# Patient Record
Sex: FEMALE | Race: BLACK OR AFRICAN AMERICAN | NOT HISPANIC OR LATINO | URBAN - METROPOLITAN AREA
[De-identification: names, ages, dates, MRNs, and addresses within clinical notes are randomized per-mention and may not be internally consistent; named-entity substitution may affect disease eponyms.]

---

## 2018-07-10 ENCOUNTER — OUTPATIENT (OUTPATIENT)
Dept: OUTPATIENT SERVICES | Facility: HOSPITAL | Age: 63
LOS: 1 days | End: 2018-07-10
Payer: COMMERCIAL

## 2018-07-10 VITALS
DIASTOLIC BLOOD PRESSURE: 67 MMHG | TEMPERATURE: 98 F | RESPIRATION RATE: 16 BRPM | WEIGHT: 143.96 LBS | SYSTOLIC BLOOD PRESSURE: 104 MMHG | HEART RATE: 65 BPM | OXYGEN SATURATION: 100 % | HEIGHT: 63 IN

## 2018-07-10 DIAGNOSIS — Z96.642 PRESENCE OF LEFT ARTIFICIAL HIP JOINT: Chronic | ICD-10-CM

## 2018-07-10 DIAGNOSIS — N95.0 POSTMENOPAUSAL BLEEDING: ICD-10-CM

## 2018-07-10 DIAGNOSIS — Z01.818 ENCOUNTER FOR OTHER PREPROCEDURAL EXAMINATION: ICD-10-CM

## 2018-07-10 DIAGNOSIS — N85.00 ENDOMETRIAL HYPERPLASIA, UNSPECIFIED: ICD-10-CM

## 2018-07-10 DIAGNOSIS — Z98.890 OTHER SPECIFIED POSTPROCEDURAL STATES: Chronic | ICD-10-CM

## 2018-07-10 PROCEDURE — G0463: CPT

## 2018-07-10 NOTE — H&P PST ADULT - NEGATIVE OPHTHALMOLOGIC SYMPTOMS
no lacrimation L/no lacrimation R/no blurred vision L/no photophobia/no blurred vision R/no diplopia

## 2018-07-10 NOTE — H&P PST ADULT - NSANTHOSAYNRD_GEN_A_CORE
No. DASH screening performed.  STOP BANG Legend: 0-2 = LOW Risk; 3-4 = INTERMEDIATE Risk; 5-8 = HIGH Risk

## 2018-07-10 NOTE — H&P PST ADULT - NEGATIVE ALLERGY TYPES
no reactions to insect bites/no indoor environmental allergies/no reactions to food/no reactions to animals/no reactions to medicines

## 2020-01-08 ENCOUNTER — EMERGENCY (EMERGENCY)
Facility: HOSPITAL | Age: 65
LOS: 1 days | Discharge: HOME | End: 2020-01-08
Attending: EMERGENCY MEDICINE
Payer: COMMERCIAL

## 2020-01-08 VITALS
HEIGHT: 65 IN | TEMPERATURE: 98 F | DIASTOLIC BLOOD PRESSURE: 78 MMHG | HEART RATE: 87 BPM | WEIGHT: 136.03 LBS | SYSTOLIC BLOOD PRESSURE: 112 MMHG | OXYGEN SATURATION: 97 % | RESPIRATION RATE: 17 BRPM

## 2020-01-08 VITALS
HEART RATE: 74 BPM | OXYGEN SATURATION: 100 % | RESPIRATION RATE: 16 BRPM | DIASTOLIC BLOOD PRESSURE: 69 MMHG | TEMPERATURE: 98 F | SYSTOLIC BLOOD PRESSURE: 102 MMHG

## 2020-01-08 DIAGNOSIS — Z96.642 PRESENCE OF LEFT ARTIFICIAL HIP JOINT: Chronic | ICD-10-CM

## 2020-01-08 DIAGNOSIS — Z98.890 OTHER SPECIFIED POSTPROCEDURAL STATES: Chronic | ICD-10-CM

## 2020-01-08 PROBLEM — N95.0 POSTMENOPAUSAL BLEEDING: Chronic | Status: ACTIVE | Noted: 2018-07-10

## 2020-01-08 PROBLEM — R76.11 NONSPECIFIC REACTION TO TUBERCULIN SKIN TEST WITHOUT ACTIVE TUBERCULOSIS: Chronic | Status: ACTIVE | Noted: 2018-07-10

## 2020-01-08 PROBLEM — F32.9 MAJOR DEPRESSIVE DISORDER, SINGLE EPISODE, UNSPECIFIED: Chronic | Status: ACTIVE | Noted: 2018-07-10

## 2020-01-08 PROBLEM — N85.00 ENDOMETRIAL HYPERPLASIA, UNSPECIFIED: Chronic | Status: ACTIVE | Noted: 2018-07-10

## 2020-01-08 LAB
ALBUMIN SERPL ELPH-MCNC: 4.1 G/DL — SIGNIFICANT CHANGE UP (ref 3.3–5)
ALP SERPL-CCNC: 109 U/L — SIGNIFICANT CHANGE UP (ref 40–120)
ALT FLD-CCNC: 34 U/L — SIGNIFICANT CHANGE UP (ref 10–45)
ANION GAP SERPL CALC-SCNC: 13 MMOL/L — SIGNIFICANT CHANGE UP (ref 5–17)
APTT BLD: 36 SEC — SIGNIFICANT CHANGE UP (ref 27.5–36.3)
AST SERPL-CCNC: 18 U/L — SIGNIFICANT CHANGE UP (ref 10–40)
BASOPHILS # BLD AUTO: 0.03 K/UL — SIGNIFICANT CHANGE UP (ref 0–0.2)
BASOPHILS NFR BLD AUTO: 0.4 % — SIGNIFICANT CHANGE UP (ref 0–2)
BILIRUB SERPL-MCNC: 0.2 MG/DL — SIGNIFICANT CHANGE UP (ref 0.2–1.2)
BUN SERPL-MCNC: 10 MG/DL — SIGNIFICANT CHANGE UP (ref 7–23)
CALCIUM SERPL-MCNC: 9.5 MG/DL — SIGNIFICANT CHANGE UP (ref 8.4–10.5)
CHLORIDE SERPL-SCNC: 100 MMOL/L — SIGNIFICANT CHANGE UP (ref 96–108)
CO2 SERPL-SCNC: 25 MMOL/L — SIGNIFICANT CHANGE UP (ref 22–31)
CREAT SERPL-MCNC: 1.06 MG/DL — SIGNIFICANT CHANGE UP (ref 0.5–1.3)
EOSINOPHIL # BLD AUTO: 0.42 K/UL — SIGNIFICANT CHANGE UP (ref 0–0.5)
EOSINOPHIL NFR BLD AUTO: 5.6 % — SIGNIFICANT CHANGE UP (ref 0–6)
GLUCOSE SERPL-MCNC: 113 MG/DL — HIGH (ref 70–99)
HCT VFR BLD CALC: 39.8 % — SIGNIFICANT CHANGE UP (ref 34.5–45)
HGB BLD-MCNC: 13 G/DL — SIGNIFICANT CHANGE UP (ref 11.5–15.5)
IMM GRANULOCYTES NFR BLD AUTO: 0.1 % — SIGNIFICANT CHANGE UP (ref 0–1.5)
INR BLD: 0.93 RATIO — SIGNIFICANT CHANGE UP (ref 0.88–1.16)
LIDOCAIN IGE QN: 151 U/L — HIGH (ref 7–60)
LYMPHOCYTES # BLD AUTO: 3.01 K/UL — SIGNIFICANT CHANGE UP (ref 1–3.3)
LYMPHOCYTES # BLD AUTO: 40.4 % — SIGNIFICANT CHANGE UP (ref 13–44)
MAGNESIUM SERPL-MCNC: 2 MG/DL — SIGNIFICANT CHANGE UP (ref 1.6–2.6)
MCHC RBC-ENTMCNC: 31.3 PG — SIGNIFICANT CHANGE UP (ref 27–34)
MCHC RBC-ENTMCNC: 32.7 GM/DL — SIGNIFICANT CHANGE UP (ref 32–36)
MCV RBC AUTO: 95.7 FL — SIGNIFICANT CHANGE UP (ref 80–100)
MONOCYTES # BLD AUTO: 0.54 K/UL — SIGNIFICANT CHANGE UP (ref 0–0.9)
MONOCYTES NFR BLD AUTO: 7.2 % — SIGNIFICANT CHANGE UP (ref 2–14)
NEUTROPHILS # BLD AUTO: 3.44 K/UL — SIGNIFICANT CHANGE UP (ref 1.8–7.4)
NEUTROPHILS NFR BLD AUTO: 46.3 % — SIGNIFICANT CHANGE UP (ref 43–77)
NRBC # BLD: 0 /100 WBCS — SIGNIFICANT CHANGE UP (ref 0–0)
PHOSPHATE SERPL-MCNC: 3.3 MG/DL — SIGNIFICANT CHANGE UP (ref 2.5–4.5)
PLATELET # BLD AUTO: 379 K/UL — SIGNIFICANT CHANGE UP (ref 150–400)
POTASSIUM SERPL-MCNC: 4 MMOL/L — SIGNIFICANT CHANGE UP (ref 3.5–5.3)
POTASSIUM SERPL-SCNC: 4 MMOL/L — SIGNIFICANT CHANGE UP (ref 3.5–5.3)
PROT SERPL-MCNC: 7.8 G/DL — SIGNIFICANT CHANGE UP (ref 6–8.3)
PROTHROM AB SERPL-ACNC: 10.7 SEC — SIGNIFICANT CHANGE UP (ref 10–12.9)
RBC # BLD: 4.16 M/UL — SIGNIFICANT CHANGE UP (ref 3.8–5.2)
RBC # FLD: 13.1 % — SIGNIFICANT CHANGE UP (ref 10.3–14.5)
SODIUM SERPL-SCNC: 138 MMOL/L — SIGNIFICANT CHANGE UP (ref 135–145)
WBC # BLD: 7.45 K/UL — SIGNIFICANT CHANGE UP (ref 3.8–10.5)
WBC # FLD AUTO: 7.45 K/UL — SIGNIFICANT CHANGE UP (ref 3.8–10.5)

## 2020-01-08 PROCEDURE — 85610 PROTHROMBIN TIME: CPT

## 2020-01-08 PROCEDURE — 99284 EMERGENCY DEPT VISIT MOD MDM: CPT

## 2020-01-08 PROCEDURE — 96374 THER/PROPH/DIAG INJ IV PUSH: CPT

## 2020-01-08 PROCEDURE — 85027 COMPLETE CBC AUTOMATED: CPT

## 2020-01-08 PROCEDURE — 80053 COMPREHEN METABOLIC PANEL: CPT

## 2020-01-08 PROCEDURE — 84100 ASSAY OF PHOSPHORUS: CPT

## 2020-01-08 PROCEDURE — 83690 ASSAY OF LIPASE: CPT

## 2020-01-08 PROCEDURE — 85730 THROMBOPLASTIN TIME PARTIAL: CPT

## 2020-01-08 PROCEDURE — 99284 EMERGENCY DEPT VISIT MOD MDM: CPT | Mod: 25

## 2020-01-08 PROCEDURE — 83735 ASSAY OF MAGNESIUM: CPT

## 2020-01-08 RX ORDER — SODIUM CHLORIDE 9 MG/ML
1000 INJECTION INTRAMUSCULAR; INTRAVENOUS; SUBCUTANEOUS ONCE
Refills: 0 | Status: COMPLETED | OUTPATIENT
Start: 2020-01-08 | End: 2020-01-08

## 2020-01-08 RX ORDER — ONDANSETRON 8 MG/1
4 TABLET, FILM COATED ORAL ONCE
Refills: 0 | Status: COMPLETED | OUTPATIENT
Start: 2020-01-08 | End: 2020-01-08

## 2020-01-08 RX ADMIN — Medication 20 MILLIGRAM(S): at 18:04

## 2020-01-08 RX ADMIN — SODIUM CHLORIDE 1000 MILLILITER(S): 9 INJECTION INTRAMUSCULAR; INTRAVENOUS; SUBCUTANEOUS at 17:36

## 2020-01-08 RX ADMIN — ONDANSETRON 4 MILLIGRAM(S): 8 TABLET, FILM COATED ORAL at 17:37
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
## 2020-01-08 NOTE — ED PROVIDER NOTE - PATIENT PORTAL LINK FT
You can access the FollowMyHealth Patient Portal offered by [facility] by registering at the following website: [email or web address]. By joining [employer]’s FollowMyHealth portal, you will also be able to view your health information using other applications (apps) compatible with our system.

## 2020-01-08 NOTE — ED PROVIDER NOTE - NSFOLLOWUPCLINICS_GEN_ALL_ED_FT
[facility]  Gastroenterology  [address] [place] [id number]  Phone: [phone]  Fax:   Follow Up Time:

## 2020-01-08 NOTE — ED PROVIDER NOTE - PROGRESS NOTE DETAILS
KENNY Mancia: patient tolerated chicken rice and water without vomiting cleared for discharge home with strict return precautions and GI follow up

## 2020-01-08 NOTE — ED PROVIDER NOTE - OBJECTIVE STATEMENT
63 y/o female PMHx anxiety depression on wellbutrin Psx D&C now presenting to the ED with diffuse cramping abdominal pain, N/V, and mucous bloody stools since yesterday. Patient reported she ate a salad that "might have been bad" with "processed meat" and then shortly after started to experience abdominal cramps followed by vomiting and bloody mucous stools. Patient was able to tolerate solids and liquids today however cramping is persisting. Patient denied sick contacts with similar symptoms, CP, SOB, fever chills, rash, recent abx use, similar event in the past

## 2020-01-08 NOTE — ED PROVIDER NOTE - NSFOLLOWUPINSTRUCTIONS_ED_ALL_ED_FT
Follow up with your Primary Care Physician within the next 2-3 days  Bring a copy of your test results with you to your appointment  Continue your current medication regimen  Return to the Emergency Room if you experience new or worsening symptoms abdominal pain, nausea, vomiting, fever chills, cough, chest pain, shortness of breath  Establish care with gastroenterologist within 1 week of discharge Follow up with your Primary Care Physician within the next 2-3 days  Bring a copy of your test results with you to your appointment  Continue your current medication regimen  Return to the Emergency Room if you experience new or worsening symptoms abdominal pain, nausea, vomiting, fever chills, cough, chest pain, shortness of breath  Establish care with gastroenterologist within 1 week of discharge  Remain well hydrated  [place] diet and avoid dairy until you feel back to baseline

## 2020-01-08 NOTE — ED ADULT NURSE NOTE - OBJECTIVE STATEMENT
64 yrs old female present to the ER for abd pain associated with nausea, vomiting and diarrhea. As per pt she had a sandwich yesterday am  which she made with meat cutlets from her neighborhood supermarket when she fell ill shortly afterwards and started to experience nausea, vomiting and diarrhea. Pt reported that she vomited several times yesterday and has being experiencing waxing and waning abd pain cramping in quality and 10/10 on pain scale. Pt reported that she took both Pepto bismol and Mylanta which did not provide any relief. pt also endorses blood in stooll.  Denies chills and fever.

## 2020-01-08 NOTE — ED PROVIDER NOTE - ATTENDING CONTRIBUTION TO CARE
Patient presenting with abdominal cramping, nausea and vomiting.  Began two days ago after eating a salad she made, about one hour later began having copious vomiting and abdominal cramping.  Next day vomiting resolved and had diarrhea all day also passing blood clots in stool, which also resolved yesterday.  Now able to eat/drink but continuing to have abdominal pains so presenting for evaluation.  No prior abdominal surgeries, no medications to date for symptoms.  Denying fevers, chills, chest pains, shortness of breath.    A 14 point review of systems is negative except as in HPI or otherwise documented.    Exam:  General: Patient well appearing, vital signs within normal limits  HEENT: airway patent with moist mucous membranes  Cardiac: RRR S1/S2 with strong peripheral pulses  Respiratory: lungs clear without respiratory distress  GI: abdomen soft, non tender, non distended  Neuro: no gross neurologic deficits  Skin: warm, well perfused  Psych: normal mood and affect    Patient presenting with progressive nausea, vomiting and diarrhea, all now improving, reporting passing blood clots but no melena or july rectal bleeding and also now resolved, benign abdominal exam in Emergency Department, will obtain screening labs, treat symptomatically and PO challenge.

## 2022-01-20 ENCOUNTER — TRANSCRIPTION ENCOUNTER (OUTPATIENT)
Age: 67
End: 2022-01-20

## 2022-02-16 ENCOUNTER — TRANSCRIPTION ENCOUNTER (OUTPATIENT)
Age: 67
End: 2022-02-16

## 2022-04-22 ENCOUNTER — TRANSCRIPTION ENCOUNTER (OUTPATIENT)
Age: 67
End: 2022-04-22

## 2022-07-14 NOTE — H&P PST ADULT - DOES THIS PATIENT HAVE A HISTORY OF OR HAS BEEN DX WITH HEART FAILURE?
Infusion Nursing Note:  Latanya Padron presents today for Prolia.    Patient seen by provider today: No   present during visit today: Not Applicable.    Note: Patient was scheduled for Virtual Oncology visit with Dr. Dhillon today but she requested to be scheduled for in person visit. Moderate fatigue level, Currently being treated for Pnemonia, on an antibiotic, feeling some improvement. Denies fever or chills. Occas cough. SOA with minimal activity. VSS, afebrile.     Intravenous Access:  No Intravenous access/labs at this visit.    Treatment Conditions:  Lab Results   Component Value Date     07/12/2022    POTASSIUM 3.7 07/12/2022    CR 1.25 (H) 07/12/2022    IVANNA 9.3 07/12/2022    BILITOTAL 0.5 07/12/2022    ALBUMIN 3.1 (L) 07/12/2022    ALT 20 07/12/2022    AST 13 07/12/2022     Results reviewed, labs MET treatment parameters, ok to proceed with treatment.    Post Infusion Assessment:  Patient tolerated injection without incident.  No bleeding, pain or swelling at site.     Discharge Plan:   [facility]  Patient will return in 6 months for next appointment.   Patient discharged in stable condition accompanied by: self.  Departure Mode: Ambulatory.      Marlin Alcala RN                    
no

## 2022-11-05 ENCOUNTER — EMERGENCY (EMERGENCY)
Facility: HOSPITAL | Age: 67
LOS: 1 days | Discharge: HOME | End: 2022-11-05
Attending: EMERGENCY MEDICINE
Payer: MEDICARE

## 2022-11-05 VITALS
HEART RATE: 72 BPM | DIASTOLIC BLOOD PRESSURE: 69 MMHG | OXYGEN SATURATION: 98 % | RESPIRATION RATE: 24 BRPM | SYSTOLIC BLOOD PRESSURE: 137 MMHG | WEIGHT: 149.91 LBS | TEMPERATURE: 98 F | HEIGHT: 69 IN

## 2022-11-05 VITALS
DIASTOLIC BLOOD PRESSURE: 78 MMHG | RESPIRATION RATE: 16 BRPM | TEMPERATURE: 98 F | SYSTOLIC BLOOD PRESSURE: 106 MMHG | OXYGEN SATURATION: 100 % | HEART RATE: 65 BPM

## 2022-11-05 DIAGNOSIS — Z98.890 OTHER SPECIFIED POSTPROCEDURAL STATES: Chronic | ICD-10-CM

## 2022-11-05 DIAGNOSIS — Z96.642 PRESENCE OF LEFT ARTIFICIAL HIP JOINT: Chronic | ICD-10-CM

## 2022-11-05 LAB
ALBUMIN SERPL ELPH-MCNC: 4.2 G/DL — SIGNIFICANT CHANGE UP (ref 3.3–5)
ALP SERPL-CCNC: 118 U/L — SIGNIFICANT CHANGE UP (ref 40–120)
ALT FLD-CCNC: 25 U/L — SIGNIFICANT CHANGE UP (ref 10–45)
ANION GAP SERPL CALC-SCNC: 14 MMOL/L — SIGNIFICANT CHANGE UP (ref 5–17)
AST SERPL-CCNC: 24 U/L — SIGNIFICANT CHANGE UP (ref 10–40)
BILIRUB SERPL-MCNC: 0.5 MG/DL — SIGNIFICANT CHANGE UP (ref 0.2–1.2)
BUN SERPL-MCNC: 13 MG/DL — SIGNIFICANT CHANGE UP (ref 7–23)
CALCIUM SERPL-MCNC: 9.9 MG/DL — SIGNIFICANT CHANGE UP (ref 8.4–10.5)
CHLORIDE SERPL-SCNC: 103 MMOL/L — SIGNIFICANT CHANGE UP (ref 96–108)
CO2 SERPL-SCNC: 23 MMOL/L — SIGNIFICANT CHANGE UP (ref 22–31)
CREAT SERPL-MCNC: 0.97 MG/DL — SIGNIFICANT CHANGE UP (ref 0.5–1.3)
EGFR: 65 ML/MIN/1.73M2 — SIGNIFICANT CHANGE UP
GLUCOSE SERPL-MCNC: 91 MG/DL — SIGNIFICANT CHANGE UP (ref 70–99)
HCT VFR BLD CALC: 36.5 % — SIGNIFICANT CHANGE UP (ref 34.5–45)
HGB BLD-MCNC: 12.4 G/DL — SIGNIFICANT CHANGE UP (ref 11.5–15.5)
LIDOCAIN IGE QN: 35 U/L — SIGNIFICANT CHANGE UP (ref 7–60)
MCHC RBC-ENTMCNC: 31.2 PG — SIGNIFICANT CHANGE UP (ref 27–34)
MCHC RBC-ENTMCNC: 34 GM/DL — SIGNIFICANT CHANGE UP (ref 32–36)
MCV RBC AUTO: 91.7 FL — SIGNIFICANT CHANGE UP (ref 80–100)
NRBC # BLD: 0 /100 WBCS — SIGNIFICANT CHANGE UP (ref 0–0)
PLATELET # BLD AUTO: 405 K/UL — HIGH (ref 150–400)
POTASSIUM SERPL-MCNC: 3.7 MMOL/L — SIGNIFICANT CHANGE UP (ref 3.5–5.3)
POTASSIUM SERPL-SCNC: 3.7 MMOL/L — SIGNIFICANT CHANGE UP (ref 3.5–5.3)
PROT SERPL-MCNC: 8.1 G/DL — SIGNIFICANT CHANGE UP (ref 6–8.3)
RBC # BLD: 3.98 M/UL — SIGNIFICANT CHANGE UP (ref 3.8–5.2)
RBC # FLD: 13 % — SIGNIFICANT CHANGE UP (ref 10.3–14.5)
SODIUM SERPL-SCNC: 140 MMOL/L — SIGNIFICANT CHANGE UP (ref 135–145)
WBC # BLD: 8.38 K/UL — SIGNIFICANT CHANGE UP (ref 3.8–10.5)
WBC # FLD AUTO: 8.38 K/UL — SIGNIFICANT CHANGE UP (ref 3.8–10.5)

## 2022-11-05 PROCEDURE — 96375 TX/PRO/DX INJ NEW DRUG ADDON: CPT

## 2022-11-05 PROCEDURE — 74176 CT ABD & PELVIS W/O CONTRAST: CPT | Mod: 26,MA

## 2022-11-05 PROCEDURE — 36415 COLL VENOUS BLD VENIPUNCTURE: CPT

## 2022-11-05 PROCEDURE — 80053 COMPREHEN METABOLIC PANEL: CPT

## 2022-11-05 PROCEDURE — 85027 COMPLETE CBC AUTOMATED: CPT

## 2022-11-05 PROCEDURE — 83690 ASSAY OF LIPASE: CPT

## 2022-11-05 PROCEDURE — 99284 EMERGENCY DEPT VISIT MOD MDM: CPT | Mod: GC

## 2022-11-05 PROCEDURE — 96374 THER/PROPH/DIAG INJ IV PUSH: CPT

## 2022-11-05 PROCEDURE — 99284 EMERGENCY DEPT VISIT MOD MDM: CPT | Mod: 25

## 2022-11-05 PROCEDURE — 74176 CT ABD & PELVIS W/O CONTRAST: CPT | Mod: MA

## 2022-11-05 RX ORDER — ACETAMINOPHEN 500 MG
975 TABLET ORAL ONCE
Refills: 0 | Status: COMPLETED | OUTPATIENT
Start: 2022-11-05 | End: 2022-11-05

## 2022-11-05 RX ORDER — DIAZEPAM 5 MG
2.5 TABLET ORAL ONCE
Refills: 0 | Status: DISCONTINUED | OUTPATIENT
Start: 2022-11-05 | End: 2022-11-05

## 2022-11-05 RX ORDER — KETOROLAC TROMETHAMINE 30 MG/ML
15 SYRINGE (ML) INJECTION ONCE
Refills: 0 | Status: DISCONTINUED | OUTPATIENT
Start: 2022-11-05 | End: 2022-11-05

## 2022-11-05 RX ADMIN — Medication 2.5 MILLIGRAM(S): at 05:15

## 2022-11-05 RX ADMIN — Medication 15 MILLIGRAM(S): at 07:08

## 2022-11-05 RX ADMIN — Medication 975 MILLIGRAM(S): at 07:32

## 2022-11-05 RX ADMIN — Medication 15 MILLIGRAM(S): at 05:15

## 2022-11-05 NOTE — ED PROVIDER NOTE - OBJECTIVE STATEMENT
This is a 68 yo F w/ a PMHX of none who presents w/ R lower back pain.   Location: R lower back   Severity: marked   Duration: y/o AM  Timing: gradual onset  Setting: Onset after dancing yesterday in the AM  Aggravating factors: movement  Alleviating factors: none  Associated symptoms: No associated weakness, numbness, b/b incontinence, wt loss, fevers  No hx of similar sxs. No known hx of cancer, AAA, imm deficiency, OP, or recent blood stream infection. No hx of IV drug use. No hx of back surgery. Not on steroids or anti-coagulants.   Home Rx = none. Known allergies = none.

## 2022-11-05 NOTE — ED PROVIDER NOTE - NSICDXPASTMEDICALHX_GEN_ALL_CORE_FT
PAST MEDICAL HISTORY:  Depression     Endometrial hyperplasia, unspecified     Positive PPD     Postmenopausal bleeding

## 2022-11-05 NOTE — ED PROVIDER NOTE - PHYSICAL EXAMINATION
Gen: NAD, non-toxic appearing  Head: normal appearing  HEENT: normal conjunctiva  Lung: no respiratory distress, speaking in full sentences, ctab      CV: regular rate and rhythm, no murmurs  Abd: soft, non distended, non tender   MSK: no visible deformities, no midline tenderness, the back is visually unremarkable.   Neuro: alert and grossly oriented, no gross motor deficits  Skin: No july rashes

## 2022-11-05 NOTE — ED PROVIDER NOTE - ATTENDING CONTRIBUTION TO CARE
Patient presented with sudden onset right back pain, in the setting of "dancing" earlier today that triggered some degree of pain, that acutely worsened overnight.  She denies any acute trauma.  Patient on arrival look very uncomfortable, writhing in pain, with positive right straight leg test.  Patient had labs and CT to assess for acute pathology such as kidney stone versus pyelonephritis versus MSK pain.  Imaging negative.  Patient got some relief with Toradol and Valium.  Suspicion is highest for MSK injury leading to her pain.  Patient will get reevaluated and should be stable for discharge with supportive care.

## 2022-11-05 NOTE — ED PROVIDER NOTE - PROGRESS NOTE DETAILS
Roderick Chapa MD: Work up negative for e/o critical/emergent pathology. Patient symptoms improved while in the ED. VSS compared to arrival. Is at functional baseline and able to tolerate PO food/fluids. Plan = discharge w/ appropriate follow up and outpatient tx for symptom management. Patient happy and agreeable w/ this plan. See discharge instructions for further details.

## 2022-11-05 NOTE — ED ADULT NURSE NOTE - OBJECTIVE STATEMENT
66yo no PMH presents to ED via EMS for back pain. Pt states that suddenly she had pain to her back radiating down her right leg. Pt denies any trauma or injury to leg. Endorses 10/10 pain, denies taking anything for the pain. EMT states pt was ambulatory at scene to stretcher. Denies chest pain, SOB, numbness/tingling to extremities, trauma/injury. A&Ox3. Strong peripheral pulses. Neurologically intact and follows commands. Pulse motor sensation to all 4 extremities, does endorse pain with movement. Skin warm dry intact and normal for ethnicity. Sister at bedside. Stretcher locked and in lowest position, appropriate side rails up. Pt given call bell and instructed to notify RN if assistance is needed.

## 2022-11-05 NOTE — ED ADULT NURSE REASSESSMENT NOTE - NS ED NURSE REASSESS COMMENT FT1
0828 Pt pending family to pick her up pt unable to sit in a chair waiting pt verbalized  understating of d/c instructions Mita

## 2022-11-05 NOTE — ED PROVIDER NOTE - PATIENT PORTAL LINK FT
You can access the FollowMyHealth Patient Portal offered by [facility] by registering at the following website: [email or web address]. By joining [employer]s FollowMyHealth portal, you will also be able to view your health information using other applications (apps) compatible with our system.

## 2022-11-05 NOTE — ED ADULT NURSE NOTE - HISTORY OF COVID-19 VACCINATION
----- Message from Ashley Sargent RT sent at 2/19/2019  9:27 AM CST -----  Contact: Pt    Pt , requesting to inform her prescription have not reached the pharmacy as of yet, thanks.  
----- Message from Hayley Mcdowell sent at 2/19/2019 10:21 AM CST -----  Contact: Yola  Type: Needs Medical Advice    Who Called:  patient  Pharmacy name and phone #:    [id number] [place] - [address]  [address] [place] [address]  Phone: [phone] Fax: [phone]  Best Call Back Number: [phone]  Additional Information: patient states her sugar is still high 250 currently--patient states that someone called her & told her we called something in to her pharmacy but her pharmacy doesn't have anything--please advise--thank you  
Patient advised she needs vitamin D to [facility]. And she is calling to report her blood sugars are high 250  
Vaccine status unknown

## 2022-11-05 NOTE — ED PROVIDER NOTE - NSFOLLOWUPINSTRUCTIONS_ED_ALL_ED_FT
Follow up with the [facility]. Use the contact information provided above to make the appointment. Inform the people making the appointment that you were in the Emergency Department, this will expedite your appointment. Call the Emergency Department if you have difficulties getting your appointment.    Immediately return to the Emergency Department for any new or markedly worsening symptoms.    Alternate between Tylenol and Ibuprofen. Take 1 g of Tylenol, 4 hours later take 600 mg of Ibuprofen, 4 hours after this take 1 g of Tylenol. Continuing alternating like this as needed for pain. If you do not have pain, you do not need to take this medication.      Use over counter ibuprofen and tylenol for management of your pain. Use only as needed. Read the instructions on the medication container carefully. Follow these instructions when using these medications. These medications can be dangerous when used incorrectly. Possible consequences include liver and kidney disease. These medications are very safe when used correctly. Follow up with the Primary Care Doctor. Call the Emergency Department if you have difficulties getting your appointment.    Immediately return to the Emergency Department for any new or markedly worsening symptoms.    Alternate between Tylenol and Ibuprofen. Take 1 g of Tylenol, 4 hours later take 600 mg of Ibuprofen, 4 hours after this take 1 g of Tylenol. Continuing alternating like this as needed for pain. If you do not have pain, you do not need to take this medication.      Use over counter ibuprofen and tylenol for management of your pain. Use only as needed. Read the instructions on the medication container carefully. Follow these instructions when using these medications. These medications can be dangerous when used incorrectly. Possible consequences include liver and kidney disease. These medications are very safe when used correctly.

## 2022-11-05 NOTE — ED PROVIDER NOTE - NS ED ROS FT
GENERAL: no fever  EYES: no eye pain  HEENT: no neck pain  CARDIAC: no chest pain  PULMONARY: no SOB  GI: no abdominal pain  : no dysuria  SKIN: no rashes  NEURO: no headache  MSK: + back pain

## 2022-11-05 NOTE — ED PROVIDER NOTE - CLINICAL SUMMARY MEDICAL DECISION MAKING FREE TEXT BOX
This is a 66 yo F w/ a PMHX of none who presents w/ R lower back pain. vss. no e/o acute cord compression. r/out renal stone, uti. likely msk pain. ct a/p, cbc, cmp, ua/ucx, pain control

## 2022-12-18 ENCOUNTER — NON-APPOINTMENT (OUTPATIENT)
Age: 67
End: 2022-12-18

## 2023-06-27 ENCOUNTER — APPOINTMENT (OUTPATIENT)
Dept: ORTHOPEDIC SURGERY | Facility: CLINIC | Age: 68
End: 2023-06-27

## 2023-06-28 ENCOUNTER — APPOINTMENT (OUTPATIENT)
Dept: ORTHOPEDIC SURGERY | Facility: CLINIC | Age: 68
End: 2023-06-28

## 2023-07-10 ENCOUNTER — APPOINTMENT (OUTPATIENT)
Dept: ORTHOPEDIC SURGERY | Facility: CLINIC | Age: 68
End: 2023-07-10
Payer: MEDICARE

## 2023-07-10 VITALS
HEART RATE: 60 BPM | BODY MASS INDEX: 22.94 KG/M2 | TEMPERATURE: 97.3 F | WEIGHT: 136 LBS | HEIGHT: 64.5 IN | DIASTOLIC BLOOD PRESSURE: 88 MMHG | OXYGEN SATURATION: 97 % | SYSTOLIC BLOOD PRESSURE: 172 MMHG

## 2023-07-10 DIAGNOSIS — M54.2 CERVICALGIA: ICD-10-CM

## 2023-07-10 PROCEDURE — 99203 OFFICE O/P NEW LOW 30 MIN: CPT

## 2023-07-10 PROCEDURE — 72040 X-RAY EXAM NECK SPINE 2-3 VW: CPT

## 2023-07-10 RX ORDER — METHYLPREDNISOLONE 4 MG/1
4 TABLET ORAL
Qty: 1 | Refills: 0 | Status: ACTIVE | COMMUNITY
Start: 2023-07-10 | End: 1900-01-01

## 2023-07-10 RX ORDER — CYCLOBENZAPRINE HYDROCHLORIDE 10 MG/1
10 TABLET, FILM COATED ORAL 3 TIMES DAILY
Qty: 30 | Refills: 0 | Status: ACTIVE | COMMUNITY
Start: 2023-07-10 | End: 1900-01-01

## 2023-07-10 NOTE — PHYSICAL EXAM
[Ataxic] : not ataxic [de-identified] : Examination of the cervical spine reveals no midline or paraspinal tenderness to palpation. No cervical lymphadenopathy. Decreased range of motion with respect to flexion, extension, rotation, and lateral bending. Negative Spurlings. Negative Lhermitte's. Full range of motion bilateral shoulders without evidence of impingement. No instability of bilateral upper extremities.  Cranial nerves II through XII grossly intact. Intact sensation bilateral upper extremities. 5/5 deltoids biceps triceps wrist extensors wrist flexors finger flexors and hand intrinsics. 1+ biceps triceps and brachioradialis reflexes. Negative Aguilar's. 2+ radial pulse. Negative Tinel's over the cubital and carpal tunnel. No skin lesions on the right and left upper extremities. [de-identified] : AP lateral cervical x-rays with spondylosis most pronounced at C5-6.  No aggressive lesions.

## 2023-07-10 NOTE — DISCUSSION/SUMMARY
[de-identified] : Given her persistent symptoms we will obtain a cervical spine MRI.  She also try an oral steroid taper and a muscle relaxant.  She will engage in a course of physical therapy.  Follow-up after the MRI.

## 2023-07-10 NOTE — HISTORY OF PRESENT ILLNESS
[de-identified] : Ms. MAAME MCBRIDE  is a 67 year old female who presents with 6 weeks of neck and bilateral trap pain that is not improving.  Denies any UE radicular symptoms.  Normal bowel and bladder control.   Denies any recent fevers, chills, sweats, weight loss, or infection.  She has tried OTC medications with minimal relief.  Her ROM is limited. \par \par The patients past medical history, past surgical history, medications, allergies, and social history were reviewed by me today with the patient and documented accordingly.  In addition, the patient's family history, which is noncontributory to their visit, was also reviewed.\par

## 2023-12-12 ENCOUNTER — APPOINTMENT (OUTPATIENT)
Dept: OPHTHALMOLOGY | Facility: CLINIC | Age: 68
End: 2023-12-12
Payer: MEDICARE

## 2023-12-12 ENCOUNTER — NON-APPOINTMENT (OUTPATIENT)
Age: 68
End: 2023-12-12

## 2023-12-12 PROCEDURE — 92004 COMPRE OPH EXAM NEW PT 1/>: CPT

## 2023-12-13 NOTE — H&P PST ADULT - LYMPH NODES
Patient complains of feeling lightheaded, denies respiratory distress, denies chest pain, denies dizziness.
detailed exam

## 2024-03-06 NOTE — ASU PATIENT PROFILE, ADULT - NSICDXPASTSURGICALHX_GEN_ALL_CORE_FT
PAST SURGICAL HISTORY:  H/O cataract extraction     H/O dilation and curettage     History of hip replacement, total, left

## 2024-03-07 ENCOUNTER — APPOINTMENT (OUTPATIENT)
Dept: OPHTHALMOLOGY | Facility: AMBULATORY SURGERY CENTER | Age: 69
End: 2024-03-07

## 2024-03-07 ENCOUNTER — OUTPATIENT (OUTPATIENT)
Dept: OUTPATIENT SERVICES | Facility: HOSPITAL | Age: 69
LOS: 1 days | Discharge: HOME | End: 2024-03-07
Payer: MEDICARE

## 2024-03-07 VITALS
OXYGEN SATURATION: 100 % | RESPIRATION RATE: 16 BRPM | HEIGHT: 64.5 IN | DIASTOLIC BLOOD PRESSURE: 70 MMHG | HEART RATE: 66 BPM | WEIGHT: 139.11 LBS | TEMPERATURE: 97 F | SYSTOLIC BLOOD PRESSURE: 112 MMHG

## 2024-03-07 VITALS
TEMPERATURE: 97 F | RESPIRATION RATE: 16 BRPM | SYSTOLIC BLOOD PRESSURE: 122 MMHG | DIASTOLIC BLOOD PRESSURE: 76 MMHG | HEART RATE: 61 BPM | OXYGEN SATURATION: 100 %

## 2024-03-07 DIAGNOSIS — Z96.642 PRESENCE OF LEFT ARTIFICIAL HIP JOINT: Chronic | ICD-10-CM

## 2024-03-07 DIAGNOSIS — Z98.890 OTHER SPECIFIED POSTPROCEDURAL STATES: Chronic | ICD-10-CM

## 2024-03-07 DIAGNOSIS — Z98.49 CATARACT EXTRACTION STATUS, UNSPECIFIED EYE: Chronic | ICD-10-CM

## 2024-03-07 PROCEDURE — 66982 XCAPSL CTRC RMVL CPLX WO ECP: CPT | Mod: LT

## 2024-03-07 DEVICE — LENS IOL TECNIS EYHANCE DIB00 21.0D
Type: IMPLANTABLE DEVICE | Status: NON-FUNCTIONAL
Removed: 2024-03-07

## 2024-03-07 RX ORDER — OFLOXACIN 0.3 %
1 DROPS OPHTHALMIC (EYE)
Refills: 0 | Status: COMPLETED | OUTPATIENT
Start: 2024-03-07 | End: 2024-03-07

## 2024-03-07 RX ORDER — CETIRIZINE HYDROCHLORIDE 10 MG/1
1 TABLET ORAL
Refills: 0 | DISCHARGE

## 2024-03-07 RX ORDER — TROPICAMIDE 1 %
1 DROPS OPHTHALMIC (EYE)
Refills: 0 | Status: COMPLETED | OUTPATIENT
Start: 2024-03-07 | End: 2024-03-07

## 2024-03-07 RX ORDER — PHENYLEPHRINE HCL 2.5 %
1 DROPS OPHTHALMIC (EYE)
Refills: 0 | Status: COMPLETED | OUTPATIENT
Start: 2024-03-07 | End: 2024-03-07

## 2024-03-07 RX ORDER — MONTELUKAST 4 MG/1
1 TABLET, CHEWABLE ORAL
Refills: 0 | DISCHARGE

## 2024-03-07 RX ORDER — BUPROPION HYDROCHLORIDE 150 MG/1
1 TABLET, EXTENDED RELEASE ORAL
Qty: 0 | Refills: 0 | DISCHARGE

## 2024-03-07 RX ORDER — ACETAMINOPHEN 500 MG
1000 TABLET ORAL ONCE
Refills: 0 | Status: DISCONTINUED | OUTPATIENT
Start: 2024-03-07 | End: 2024-03-07

## 2024-03-07 RX ADMIN — Medication 1 DROP(S): at 09:20

## 2024-03-07 RX ADMIN — Medication 1 DROP(S): at 09:15

## 2024-03-07 RX ADMIN — Medication 1 DROP(S): at 09:25

## 2024-03-07 NOTE — OPERATIVE REPORT - OPERATIVE RPOSRT DETAILS
SURGEON: Quan Ahmadi MD    ASSISTANT: Maureen Kline MD    PREOPERATIVE DIAGNOSIS: CATARACT, PUPILLARY MIOSIS, LEFT EYE    POSTOPERATIVE DIAGNOSIS: CATARACT, PUPILLARY MIOSIS LEFT EYE    OPERATIVE PROCEDURE: PHACOEMULSIFICATION CATARACT EXTRACTION WITH POSTERIOR CHAMBER INTRAOCULAR LENS INSERTION WITH IRIS EXPANSION DEVICE, LEFT EYE    LENS USED: DIB00 +21.0D    PROCEDURE:	  The patient was brought into the operating room and placed supine on the operating room table. After uneventful induction of neuroleptic anesthesia, additional lidocaine gel was instilled into the operative eye achieving sufficient anesthesia. The patient was then prepped and draped in the usual sterile fashion. A wire lid speculum was then inserted into the operative eye giving a wide palpebral fissure.    A sonia knife was used to perform a paracentesis through clear cornea at the 5 o'clock limbal position. The anterior chamber was irrigated with lidocaine 1% non-preserved followed by dilute epinephrine. Air followed by trypan blue was injected into the anterior chamber and irrigated out with BSS. Viscoelastic was then used to maintain the anterior chamber. Kuglen hooks were used to stretch the iris to increase pupillary dilation. A keratome was used to create a clear corneal incision just inside the temporal limbus. A cystotome was inserted through the main corneal wound and used to create an anterior lens capsular leaflet. Utrata forceps were then inserted through the main wound and used to create a continuous, curvilinear capsulorrhexis. Balanced salt solution was used to hydrodissect the nucleus. The phacoemulsification unit was then used to completely phacoemulsify the nucleus, following which an aspirating hand piece was used to aspirate all cortical remnants from the capsular bag. Viscoelastic was again used to reform the anterior chamber and capsular bag.    A new foldable posterior chamber intraocular lens was brought into the surgical field. It was folded and directly injected into the capsular bag following which it was centered and secure. Viscoelastic was removed with irrigation/aspiration. All wounds were hydrated and found to be watertight.  The wounds remained watertight. An antibiotic/steroid mixture was irrigated into the conjunctival cul de sac. The lid speculum was removed from the eye and a shield placed over the eye. The patient tolerated the procedure well and went to the recovery area in good condition

## 2024-03-08 ENCOUNTER — APPOINTMENT (OUTPATIENT)
Dept: OPHTHALMOLOGY | Facility: CLINIC | Age: 69
End: 2024-03-08
Payer: MEDICARE

## 2024-03-08 ENCOUNTER — NON-APPOINTMENT (OUTPATIENT)
Age: 69
End: 2024-03-08

## 2024-03-08 PROCEDURE — 99024 POSTOP FOLLOW-UP VISIT: CPT

## 2024-03-19 ENCOUNTER — NON-APPOINTMENT (OUTPATIENT)
Age: 69
End: 2024-03-19

## 2024-03-19 ENCOUNTER — APPOINTMENT (OUTPATIENT)
Dept: OPHTHALMOLOGY | Facility: CLINIC | Age: 69
End: 2024-03-19
Payer: MEDICARE

## 2024-03-19 PROCEDURE — 99024 POSTOP FOLLOW-UP VISIT: CPT

## 2024-04-24 ENCOUNTER — APPOINTMENT (OUTPATIENT)
Dept: ORTHOPEDIC SURGERY | Facility: CLINIC | Age: 69
End: 2024-04-24
Payer: MEDICARE

## 2024-04-24 VITALS — BODY MASS INDEX: 24.41 KG/M2 | HEIGHT: 64 IN | WEIGHT: 143 LBS

## 2024-04-24 DIAGNOSIS — Z86.79 PERSONAL HISTORY OF OTHER DISEASES OF THE CIRCULATORY SYSTEM: ICD-10-CM

## 2024-04-24 DIAGNOSIS — M17.11 UNILATERAL PRIMARY OSTEOARTHRITIS, RIGHT KNEE: ICD-10-CM

## 2024-04-24 DIAGNOSIS — M79.18 MYALGIA, OTHER SITE: ICD-10-CM

## 2024-04-24 PROCEDURE — 73564 X-RAY EXAM KNEE 4 OR MORE: CPT | Mod: RT

## 2024-04-24 PROCEDURE — 99204 OFFICE O/P NEW MOD 45 MIN: CPT

## 2024-04-24 RX ORDER — NAPROXEN 500 MG/1
500 TABLET ORAL TWICE DAILY
Qty: 60 | Refills: 0 | Status: ACTIVE | COMMUNITY
Start: 2024-04-24 | End: 1900-01-01

## 2024-04-24 NOTE — IMAGING
[de-identified] :  RIGHT KNEE Inspection:  mild effusion Palpation: medial joint line tenderness, anterior tenderness Knee Range of Motion:  3-125  Strength: 5/5 Quadriceps strength, 5/5 Hamstring strength Neurological: light touch is intact throughout Ligament Stability and Special Tests:  McMurrays: neg Lachman: neg Pivot Shift: neg Posterior Drawer: neg Valgus: neg Varus: neg Patella Apprehension: neg Patella Maltracking: neg

## 2024-04-24 NOTE — HISTORY OF PRESENT ILLNESS
[de-identified] : 68 year old female  ( retired)  chronic right knee pain for years worsening since april 2024 The pain is located anterior, deep The pain is associated with clicking, swelling Worse with activity and better at rest. Has tried Tylenol

## 2024-04-24 NOTE — ASSESSMENT
[FreeTextEntry1] : Right X-Ray Examination of the KNEE (4 views): medial and severe patellofemoral degenerate changes.   - We discussed their diagnosis and treatment options at length including the risks and benefits of both surgical and non-surgical options. Surgical risks include but are not limited to pain, infection, bleeding, vascular injury, numbness, tingling, nerve damage. - Due to risks of surgery, we will continue conservative treatment with PT, icing, and anti-inflammatory medications - The patient was provided with a PT prescription to work on ROM, hip ER/abductors strengthening, quad/hamstring stretches and strengthening, and other exercises - The patient was advised to let pain guide the gradual advancement of activities. - The patient was advised to apply ice (wrapped in a towel or protective covering) to the area daily (20 minutes at a time, 2-4X/day). - naprosyn rx - Patient was given a prescription for an anti-inflammatory medication.  They will take it for the next week and then on an as needed basis, as long as there are no medical contra-indications.  Patient is counseled on possible GI, renal, and cardiovascular side effects. - Follow up as needed in 6 weeks to re-evaluate, if no improvement we spoke about possibility of viscosupplementation injections

## 2024-05-03 ENCOUNTER — APPOINTMENT (OUTPATIENT)
Dept: ORTHOPEDIC SURGERY | Facility: CLINIC | Age: 69
End: 2024-05-03

## 2024-05-22 ENCOUNTER — APPOINTMENT (OUTPATIENT)
Dept: ORTHOPEDIC SURGERY | Facility: CLINIC | Age: 69
End: 2024-05-22
Payer: MEDICARE

## 2024-05-22 VITALS — WEIGHT: 143 LBS | BODY MASS INDEX: 24.41 KG/M2 | HEIGHT: 64 IN

## 2024-05-22 DIAGNOSIS — M54.12 RADICULOPATHY, CERVICAL REGION: ICD-10-CM

## 2024-05-22 DIAGNOSIS — M19.012 PRIMARY OSTEOARTHRITIS, LEFT SHOULDER: ICD-10-CM

## 2024-05-22 DIAGNOSIS — M75.42 IMPINGEMENT SYNDROME OF LEFT SHOULDER: ICD-10-CM

## 2024-05-22 DIAGNOSIS — M75.41 IMPINGEMENT SYNDROME OF RIGHT SHOULDER: ICD-10-CM

## 2024-05-22 DIAGNOSIS — M19.011 PRIMARY OSTEOARTHRITIS, RIGHT SHOULDER: ICD-10-CM

## 2024-05-22 DIAGNOSIS — M50.90 CERVICAL DISC DISORDER, UNSPECIFIED, UNSPECIFIED CERVICAL REGION: ICD-10-CM

## 2024-05-22 PROCEDURE — 73010 X-RAY EXAM OF SHOULDER BLADE: CPT | Mod: 50

## 2024-05-22 PROCEDURE — 99214 OFFICE O/P EST MOD 30 MIN: CPT

## 2024-05-22 PROCEDURE — 72040 X-RAY EXAM NECK SPINE 2-3 VW: CPT

## 2024-05-22 PROCEDURE — 73030 X-RAY EXAM OF SHOULDER: CPT | Mod: 50

## 2024-05-22 RX ORDER — METHYLPREDNISOLONE 4 MG/1
4 TABLET ORAL
Qty: 1 | Refills: 0 | Status: ACTIVE | COMMUNITY
Start: 2024-05-22 | End: 1900-01-01

## 2024-05-22 RX ORDER — ALPRAZOLAM 0.5 MG/1
0.5 TABLET ORAL
Qty: 1 | Refills: 0 | Status: ACTIVE | COMMUNITY
Start: 2024-05-22 | End: 1900-01-01

## 2024-05-22 NOTE — IMAGING
[de-identified] : NECK: Inspection: no ecchymosis.  Palpation: trapezial tenderness.  Range of motion:  Full range of motion with mild stiffness . Pain at extremes of rotation to right.  Strength Testing: Weakness with Right Finger Abductors and Grasp Normal Deltoid, Biceps, Triceps, Wrist Flexors Neurological testing: light touch is intact throughout both upper extremities Aguilar reflex: neg Spurling test: positive   RIGHT SHOULDER Inspection: No swelling.  Palpation: Tenderness is noted at the bicipital groove, anterior and lateral.  Range of motion: There is pain with range of motion. , ER 55, @90ER 90, @90IR 30 Strength: There is pain and discomfort with strength testing. Forward Flexion 4/5. Abduction 4/5.  External Rotation 5-/5 and Internal Rotation 5/5  Neurological testings: motor and sensor intact distally. Ligament Stability and Special Tests:  There is positive arc of pain.  Shoulder apprehension: neg Shoulder relocation: neg Obriens test: pos Biceps Active test: neg Cabello Labral Shear: neg Impingement testing: pos Ruth testing: pos Whipple: pos Cross Body Adduction: neg  LEFT SHOULDER Inspection: No swelling.  Palpation: Tenderness is noted at the bicipital groove, anterior and lateral.  Range of motion: There is pain with range of motion. , ER 55, @90ER 90, @90IR 30 Strength: There is pain and discomfort with strength testing. Forward Flexion 4/5. Abduction 4/5.  External Rotation 5-/5 and Internal Rotation 5/5  Neurological testings: motor and sensor intact distally. Ligament Stability and Special Tests:  There is positive arc of pain.  Shoulder apprehension: neg Shoulder relocation: neg Obriens test: pos Biceps Active test: neg Cabello Labral Shear: neg Impingement testing: pos Ruth testing: pos Whipple: pos Cross Body Adduction: neg

## 2024-05-22 NOTE — HISTORY OF PRESENT ILLNESS
[de-identified] : 68 year old female  (RHD, retried)  chronic b/l shoulder pain since 1/2024 with NKI. The pain is located  ant and lateral and deep The pain is associated with weakness,  Worse with activity and better at rest. Has tried tylenol, activity mod

## 2024-05-22 NOTE — ASSESSMENT
[FreeTextEntry1] : Bilateral X-Ray Examination of the SHOULDER (2 views):  no fractures, subluxations or dislocations. GH deg changes X-Ray Examination of the SCAPULA 1 or 2 views shows: no significant abnormalities X-Ray Examination of the CERVICAL SPINE 3 views (or less) shows: straightening consistent with spasm and disc space narrowing.   - We discussed their diagnosis and treatment options at length including the risks and benefits of both surgical and non-surgical options. Surgical risks include but are not limited to pain, infection, bleeding, vascular injury, numbness, tingling, nerve damage. - Due to risks of surgery, we will continue conservative treatment with PT and anti-inflammatory medications. - The patient was provided with a prescription for Physical Therapy. - Rx given for Medrol dose pack - Discussed the possible side effects of medication along with the timing and frequency for taking. - mri c spine to eval HNP and fu with spine team

## 2024-05-28 ENCOUNTER — NON-APPOINTMENT (OUTPATIENT)
Age: 69
End: 2024-05-28

## 2024-05-28 ENCOUNTER — APPOINTMENT (OUTPATIENT)
Dept: OPHTHALMOLOGY | Facility: CLINIC | Age: 69
End: 2024-05-28
Payer: MEDICARE

## 2024-05-28 PROCEDURE — 99024 POSTOP FOLLOW-UP VISIT: CPT

## 2024-12-09 NOTE — ASU PREOP CHECKLIST - IDENTIFICATION BAND VERIFIED
[facility] AMBULATORY ENCOUNTER  HEMATOLOGY/ONCOLOGY VIDEO CONSULT NOTE    This visit was performed via live interactive two-way video.    During their visit, the patient was informed that their consent to treat includes permission to submit claims to their insurance on their behalf for the services received.  Clinician Location: [facility]    Patient Location: Home    HISTORY OF PRESENT ILLNESS:  Serenity Wong is a 89 year old female who presents for evaluation of monocytosis with ongoing hemolysis likely secondary to TAVR procedure.     Component      Latest Ref Rng 11/8/2024  9:37 AM 12/3/2024  7:40 AM   WBC      4.2 - 11.0 K/mcL 12.5 (H)  12.0 (H)    RBC      4.00 - 5.20 mil/mcL 3.17 (L)  3.83 (L)    HGB      12.0 - 15.5 g/dL 9.6 (L)  11.0 (L)    HCT      36.0 - 46.5 % 30.4 (L)  35.2 (L)    MCV      78.0 - 100.0 fl 95.9  91.9    MCH      26.0 - 34.0 pg 30.3  28.7    MCHC      32.0 - 36.5 g/dL 31.6 (L)  31.3 (L)    RDW-CV      11.0 - 15.0 % 15.1 (H)  14.2    RDW-SD      39.0 - 50.0 fL 53.1 (H)  47.5    PLT      140 - 450 K/mcL 249  212    NRBC      <=0 /100 WBC 0  0    Neutrophil      % 71  69    LYMPH      % 13  16    MONO      % 12  11    EOSIN      % 4  3    BASO      % 0  1    Immature Granulocytes      % 0  0    Absolute Neutrophil      1.8 - 7.7 K/mcL 8.9 (H)  8.3 (H)    Absolute Lymph      1.0 - 4.0 K/mcL 1.6  1.9    Absolute Mono      0.3 - 0.9 K/mcL 1.5 (H)  1.3 (H)    Absolute Eos      0.0 - 0.5 K/mcL 0.5  0.3    Absolute Baso      0.0 - 0.3 K/mcL 0.0  0.1    Absolute Immature Granulocytes      0.0 - 0.2 K/mcL 0.0  0.0    Sodium      135 - 145 mmol/L  134 (L)    Potassium      3.4 - 5.1 mmol/L  4.3    Chloride      97 - 110 mmol/L  101    CO2      21 - 32 mmol/L  27    ANION GAP      7 - 19 mmol/L  10    Glucose      70 - 99 mg/dL  90    BUN      6 - 20 mg/dL  25 (H)    Creatinine      0.51 - 0.95 mg/dL  1.02 (H)    Glomerular Filtration Rate      >=60   53 (L)    BUN/CREATININE  RATIO      7 - 25   25    CALCIUM      8.4 - 10.2 mg/dL  8.8    TOTAL BILIRUBIN      0.2 - 1.0 mg/dL  0.5    AST/SGOT      <=37 Units/L  23    ALT/SGPT      <64 Units/L  18    ALK PHOSPHATASE      45 - 117 Units/L  97    Albumin      3.4 - 5.0 g/dL  3.3 (L)    TOTAL PROTEIN      6.4 - 8.2 g/dL  6.5    GLOBULIN      2.0 - 4.0 g/dL  3.2    A/G Ratio, Serum      1.0 - 2.4   1.0    RETIC ABS      10 - 120 K/mcL  52    IMMATURE RETIC FRACTIONATION      1.5 - 16.0 %  16.4 (H)    RETIC COUNT      0.3 - 2.5 %  1.4    Reticulocyte Hemoglobin Content      28.6 - 36.3 pg  30.9    IRON      50 - 170 mcg/dL  22 (L)    Total Iron Binding Capacity      250 - 450 mcg/dL  317    PERCENT IRON SATURATION      15 - 45 %  7 (L)    LDH      82 - 240 Units/L  206    Ferritin      8 - 252 ng/mL  42    HAPTOGLOBIN      30 - 200 mg/dL  225 (H)    TSH      0.350 - 5.000 mcUnits/mL  3.987       Legend:  (H) High  (L) Low    14 point ROS reviewed and updated below today 10/13/2023. Clinically stable.     HEMATOLOGY HISTORY    7/21/2023 CT CHEST ABDOMEN PELVIS WO CONTRAST    FINDINGS:     CHEST: Mild patchy basilar interstitial thickening, likely atelectasis. No focal infiltrate or consolidation.      Few scattered tiny micronodules, largest measuring up to 4 mm in the posteromedial right apex (6/78), and left lower lobe (6/243). Findings unchanged from prior. No mediastinal, hilar, or axillary adenopathy.     Trace bilateral pleural effusions. No pneumothorax.     Normal heart size. No pericardial effusion. Procedural changes from transcatheter aortic valve replacement. Moderate coronary calcification. Blood pool attenuation less than myocardium.     ABDOMEN/PELVIS: Unremarkable unenhanced liver. No biliary dilation. Gallbladder present.     Unremarkable unenhanced spleen and pancreas.     No adrenal masses. Right-sided renal hypodensities likely representing cysts. No genitourinary calculi or obstruction.     Unremarkable stomach and duodenum.  Normal caliber small and large bowel. Sigmoid diverticulosis. No focal areas of bowel wall inflammation.     No adenopathy or ascites. Normal caliber abdominal aorta with moderate calcified atherosclerotic disease. Unremarkable IVC.     Absent uterus. Unremarkable urinary bladder. Portions of the pelvis partially secured due to artifact from right hip arthroplasty. Small cystic structure in the left adnexa measuring about 2 cm (4/85).     OSSEOUS STRUCTURES: Degenerative changes in the spine. No acute osseous findings. Surgical changes from right hip arthroplasty with associated metallic artifact.        IMPRESSION:     CT CHEST:  1. Minor patchy interstitial thickening, without focal consolidation. Trace effusions.  2. No mass or adenopathy. Few tiny micronodules unchanged.  3. Procedural changes from transcatheter aortic valve replacement.  4. Moderate coronary calcification.  5. Blood pool attenuation less than myocardium consistent with anemia.     CT ABDOMEN/PELVIS:  1. No discrete mass in the abdomen/pelvis.  2. Surgical absence of the uterus.  3. Sigmoid diverticulosis, without acute inflammation.  4. Unchanged small cystic structure in the left adnexa measuring 2 cm.     Electronically Signed by: Tano Georges MD   Signed on: 7/30/2023 3:12 PM   Workstation ID: [id number]    Cryoglobulin Qualitative  Order: [id number]  Status: Final result     Visible to patient: Yes (seen)     Dx: Monocytosis     0 Result Notes       Component Ref Range & Units 3 wk ago   Cryoglobulin Not Detected Not Detected    Resulting Agency  [facility]              Specimen Collected: 07/06/23 08:51 Last Resulted: 07/13/23 09:16             Cold Agglutinins  Order: [id number]  Status: Final result     Visible to patient: Yes (seen)     Dx: Monocytosis     0 Result Notes       Component Ref Range & Units 6 d ago   Cold Agglutinins <1:32 <1:32    Comment: INTERPRETIVE INFORMATION: Cold Agglutinins     Titers of 1:32 or higher are  considered elevated by this   technique. Elevated titers are rarely seen except in primary   atypical pneumonia and in certain hemolytic anemias. If the   agglutination is not reversible after incubation at 37 degrees   Celsius, then the reaction is not due to cold agglutinins.     Primary atypical pneumonia can be caused by Mycoplasma pneumoniae,   influenza A, influenza B, parainfluenza, and adenoviruses.   However, a fourfold rise in the cold agglutinins usually begins to   appear late in the first week or during the second week of the   disease and begins to decrease between the fourth and sixth week.   Low titers of cold agglutinins have been demonstrated in malaria,   peripheral vascular disease, and common respiratory disease.   Performed By: [facility]   [address] [place] [address]   : Randell Segura MD, PhD   Resulting Agency  [facility]              Specimen Collected: 07/06/23 08:51 Last Resulted: 07/11/23 07:52             Component      Latest Ref Rng 6/27/2023   RETIC ABS      10 - 120 K/mcL 117    IMMATURE RETIC FRACTIONATION      1.5 - 16.0 % 18.4 (H)    RETIC COUNT      0.3 - 2.5 % 4.1 (H)    Reticulocyte Hemoglobin Content      28.6 - 36.3 pg 32.2    IRON      50 - 170 mcg/dL 52    Total Iron Binding Capacity      250 - 450 mcg/dL 301    PERCENT IRON SATURATION      15 - 45 % 17    LDH      82 - 240 Units/L 916 (H)    HAPTOGLOBIN      30 - 200 mg/dL <8 (L)    DIR Antiglobulin TST Negative    Antibody Screen Negative    Ferritin      8 - 252 ng/mL 87       Legend:  (H) High  (L) Low    REVIEW OF SYSTEMS  GENERAL:  Patient denies headache, fevers, chills, night sweats, excessive fatigue, change in appetite, weight loss, dizziness, but complains of: being cold and fatigue  ALLERGIC/IMMUNOLOGIC: Verified allergies: No  EYES:  Patient denies significant visual difficulties, double vision, but complains of: blurry vision due to medication  ENT/MOUTH: Patient denies  problems with hearing, sore throat, mouth sores, but complains of: sinus drainage  ENDOCRINE:  Patient denies diabetes, hormone replacement, hot flashes, but complains of: thyroid disease  HEMATOLOGIC/LYMPHATIC: Patient denies easy bruising, bleeding, tender lymph nodes, swollen lymph nodes  BREASTS: Patient denies abnormal masses of breast, nipple discharge, pain  RESPIRATORY:  Patient denies lung pain with breathing, cough, coughing up blood, shortness of breath  CARDIOVASCULAR:  Patient denies anginal chest pain, palpitations, shortness of breath when lying flat, peripheral edema  GASTROINTESTINAL: Patient denies abdominal pain , nausea, vomiting, diarrhea, GI bleeding, constipation, change in bowel habits, heartburn, sensation of feeling full, difficulty swallowing  : Patient denies abnormal genital masses, blood in the urine, frequency, urgency, burning with urination, hesitancy, incontinence, vaginal bleeding, discharge  MUSCULOSKELETAL:  Patient denies bone pain, joint swelling, redness, decreased range of motion, but complains of: joint pain, pain ratin, location: arms  SKIN:  Patient denies chronic rashes, inflammation, ulcerations, itching, but complains of: skin changes dry skin  NEUROLOGIC:  Patient denies loss of balance, areas of focal weakness, abnormal gait, sensory problems, tingling, but complains of: numbness bilateral tips of fingers  PSYCHIATRIC: Patient denies but complains of: insomnia secondary to frequent urination    DATA REVIEWED:  Laboratory Data:  Recent Labs   Lab 24  0740   WBC 12.0*   RBC 3.83*   HGB 11.0*   HCT 35.2*   MCV 91.9      Absolute Neutrophils 8.3*   Absolute Lymphocytes 1.9   Absolute Monocytes 1.3*   Absolute Eosinophils  0.3   Absolute Basophils 0.1     Recent Labs   Lab 24  0740 24  0822 24  0527   Glucose 90   < > 111*   Sodium 134*   < > 129*   Potassium 4.3   < > 4.5   Chloride 101   < > 99   Carbon Dioxide 27   < > 24   BUN 25*    < > 61*   Creatinine 1.02*   < > 1.66*   Calcium 8.8   < > 7.9*   Magnesium  --   --  2.5*   Protein, Total 6.5  --   --    Albumin 3.3*  --   --    GOT/AST 23  --   --    Alkaline Phosphatase 97  --   --    GPT 18  --   --     < > = values in this interval not displayed.     Recent Labs   Lab 12/03/24  0740   Anion Gap 10   Globulin 3.2   LD, Total 206   Bilirubin, Total 0.5       Imaging Studies:  Imaging studies reviewed.      ASSESSMENT AND PLAN:    Monocytosis  Anemia NOS  S/P TAVR procedure with evidence of hemolysis from elevated reticulocyte and low haptoglobin and increased LDH and negative DENISSE and indirect screen    - we reviewed her labs dating back several years and she has had an intermittent monocytosis. She does correlate it to her diagnosis of atrial fibrillation and being placed on amiodarone though this has since been discontinued. We discussed a few potential etiologies, though now she has an ongoing anemia. 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 We have not confirmed cryoglobulinemia or cold agglutinins.   - she does have evidence of hemolysis by low haptoglobin and high reticulocyte count though this appears possibly related to his TAVR procedure. This is now improved since her TAVR procedure was redone at the end of October.   - she reports a history of iron deficiency anemia, though her current iron levels are within normal limits.We recommended repeat iron studies and these are mildly low.     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 The patient is encouraged to call between now and next visit with any problems, questions or concerns that arise.    Thank you for allowing us to collaborate in the care of  your patient.     Rioc Sequeira MD  Medical Oncology, Hematology, Hospice and Palliative Care  [facility]  [phone]    RTC 2 months via Video Visit  Labs in 2 months prior to visit - cbc with differential, CMP, reticulocyte, haptoglobin, LDH, iron, tibc, ferritin    Electronically signed by Rico Sequeira MD on 12/9/2024      done

## 2025-06-12 ENCOUNTER — NON-APPOINTMENT (OUTPATIENT)
Age: 70
End: 2025-06-12

## (undated) DEVICE — SUT NYLON 10-0 12" CU-5

## (undated) DEVICE — GOWN ROYAL SILK XL

## (undated) DEVICE — NDL RETROBULBAR VISITEC 25X1.5

## (undated) DEVICE — CANNULA IRR ANT CHAMBER 30G

## (undated) DEVICE — KIT CENTURION ANTERIOR

## (undated) DEVICE — SOL IRR BAL SALT 500ML

## (undated) DEVICE — GLV 7.5 PROTEXIS (WHITE)

## (undated) DEVICE — RETRACTOR SYNERGETICS IRIS FLEXIBLE DISP

## (undated) DEVICE — NDL HYPO SAFE 25G X 5/8" (ORANGE)

## (undated) DEVICE — PACK ANTERIOR SEGMENT

## (undated) DEVICE — DRAPE MICROSCOPE KNOB COVER SMALL (2 PCS)

## (undated) DEVICE — DRSG CURITY GAUZE SPONGE 4 X 4" 12-PLY

## (undated) DEVICE — PACK CENTURION 2.4MM

## (undated) DEVICE — KNIFE ALCON PARACENTESIS CLEARCUT SIDEPORT 1MM (YELLOW)